# Patient Record
Sex: FEMALE | Race: WHITE | NOT HISPANIC OR LATINO | Employment: OTHER | URBAN - METROPOLITAN AREA
[De-identification: names, ages, dates, MRNs, and addresses within clinical notes are randomized per-mention and may not be internally consistent; named-entity substitution may affect disease eponyms.]

---

## 2017-04-19 ENCOUNTER — LAB REQUISITION (OUTPATIENT)
Dept: LAB | Facility: HOSPITAL | Age: 77
End: 2017-04-19
Payer: MEDICARE

## 2017-04-19 DIAGNOSIS — J32.0 CHRONIC MAXILLARY SINUSITIS: ICD-10-CM

## 2017-04-19 PROCEDURE — 87205 SMEAR GRAM STAIN: CPT | Performed by: PHYSICIAN ASSISTANT

## 2017-04-19 PROCEDURE — 87070 CULTURE OTHR SPECIMN AEROBIC: CPT | Performed by: PHYSICIAN ASSISTANT

## 2017-04-21 LAB
BACTERIA WND AEROBE CULT: NORMAL
GRAM STN SPEC: NORMAL
GRAM STN SPEC: NORMAL

## 2018-05-22 RX ORDER — LEVOFLOXACIN 500 MG/1
750 TABLET, FILM COATED ORAL EVERY 24 HOURS
COMMUNITY

## 2018-05-22 RX ORDER — LANOLIN ALCOHOL/MO/W.PET/CERES
1 CREAM (GRAM) TOPICAL 3 TIMES DAILY
COMMUNITY

## 2018-05-22 RX ORDER — MELOXICAM 15 MG/1
15 TABLET ORAL DAILY
COMMUNITY

## 2018-05-22 RX ORDER — B-COMPLEX WITH VITAMIN C
1 TABLET ORAL 2 TIMES DAILY WITH MEALS
COMMUNITY

## 2018-05-22 RX ORDER — ATENOLOL 100 MG/1
50 TABLET ORAL
COMMUNITY

## 2018-05-22 RX ORDER — MAGNESIUM 30 MG
30 TABLET ORAL 3 TIMES DAILY
COMMUNITY

## 2018-05-22 RX ORDER — MULTIVITAMIN
1 TABLET ORAL DAILY
COMMUNITY

## 2018-05-22 RX ORDER — PAROXETINE HYDROCHLORIDE 20 MG/1
10 TABLET, FILM COATED ORAL DAILY
COMMUNITY

## 2018-05-22 RX ORDER — ALPRAZOLAM 0.25 MG/1
TABLET ORAL 2 TIMES DAILY
COMMUNITY

## 2018-05-22 RX ORDER — OMEPRAZOLE 20 MG/1
20 CAPSULE, DELAYED RELEASE ORAL DAILY
COMMUNITY

## 2018-05-22 RX ORDER — MULTIVIT-MIN/IRON/FOLIC ACID/K 18-600-40
1000 CAPSULE ORAL EVERY EVENING
COMMUNITY

## 2018-05-22 NOTE — PRE-PROCEDURE INSTRUCTIONS
Pre-Surgery Instructions:   Medication Instructions    ALPRAZolam (XANAX) 0 25 mg tablet Instructed patient per Anesthesia Guidelines   atenolol (TENORMIN) 100 mg tablet Instructed patient per Anesthesia Guidelines   calcium carbonate-vitamin D (OSCAL-D) 500 mg-200 units per tablet Instructed patient per Anesthesia Guidelines   Cholecalciferol (VITAMIN D) 2000 units CAPS Instructed patient per Anesthesia Guidelines   glucosamine-chondroitin 500-400 MG tablet Instructed patient per Anesthesia Guidelines   levofloxacin (LEVAQUIN) 500 mg tablet Instructed patient per Anesthesia Guidelines   magnesium 30 MG tablet Instructed patient per Anesthesia Guidelines   meloxicam (MOBIC) 15 mg tablet Instructed patient per Anesthesia Guidelines   Multiple Vitamin (MULTIVITAMIN) tablet Instructed patient per Anesthesia Guidelines   omeprazole (PriLOSEC) 20 mg delayed release capsule Instructed patient per Anesthesia Guidelines   PARoxetine (PAXIL) 20 mg tablet Instructed patient per Anesthesia Guidelines   Polyethylene Glycol 400 (BLINK TEARS OP) Instructed patient per Anesthesia Guidelines  To take xanax, paxil, omeprazole and eye drops a m   Of surgery

## 2018-05-23 NOTE — H&P
H&P Exam - Urology       Patient: Jayleen Kauffman   : 1940 Sex: female   MRN: [id number]     CSN: [id number]      History of Present Illness   HPI:  Jayleen Kauffman is a 69 y o  female who presents with right hydronephrosis for evaluation        Review of Systems:   Constitutional:  Negative for activity change, fever, chills and diaphoresis  HENT: Negative for hearing loss and trouble swallowing  Eyes: Negative for itching and visual disturbance  Respiratory: Negative for chest tightness and shortness of breath  Cardiovascular: Negative for chest pain, edema  Gastrointestinal: Negative for abdominal distention, na abdominal pain, constipation, diarrhea, Nausea and vomiting  Genitourinary: Negative for decreased urine volume, difficulty urinating, dysuria, enuresis, frequency, hematuria and urgency  Musculoskeletal: Negative for gait problem and myalgias  Neurological: Negative for dizziness and headaches  Hematological: Does not bruise/bleed easily         Historical Information   Past Medical History:   Diagnosis Date    Anxiety     Arthritis     Breast CA ([facility] )     had right breast ca - had a lumpectomy followed with radiation RX; re-occurred  2017 with mets to spine    Cancer ([facility] )     Chronic kidney disease     Fibromyalgia, primary     GERD (gastroesophageal reflux disease)     H/O seasonal allergies     Hiatal hernia     Hydronephrosis     right side- possibly a tumor on the kidney    Hypertension     Knee joint pain     right knee from djd    Pneumonia     on antibiotics for pneumonia x 10 days- finishing    RX today     Past Surgical History:   Procedure Laterality Date    ABCESS DRAINAGE      left hand    BREAST BIOPSY      left x2 in the 's then right breast 2005    BREAST SURGERY      CARPAL TUNNEL RELEASE      CATARACT EXTRACTION      CHOLECYSTECTOMY      COLONOSCOPY      CYST REMOVAL      right leg    FOOT SURGERY      HIP ARTHROPLASTY Right       JOINT REPLACEMENT      LYMPH NODE BIOPSY      left  axilla    REMOVAL VENOUS PORT (PORT-A-CATH)  2017    SEPTOPLASTY  2013    TONSILLECTOMY       Social History   History   Alcohol Use No     History   Drug Use No     History   Smoking Status    Never Smoker   Smokeless Tobacco    Never Used     Family History: History reviewed  No pertinent family history  Meds/Allergies   No prescriptions prior to admission  No Known Allergies    Objective   Vitals: Ht 5' 3" (1 6 m)   Wt 73 9 kg (163 lb)   BMI 28 87 kg/m²     Physical Exam:  General Alert awake   Normocephalic atraumatic PERRLA  Lungs clear bilaterally  Cardiac normal S1 normal S2  Abdomen soft, flank pain  Extremities no edema    No intake/output data recorded      Invasive Devices          No matching active lines, drains, or airways              Lab Results: CBC: No results found for: WBC, HGB, HCT, MCV, PLT, ADJUSTEDWBC, MCH, MCHC, RDW, MPV, NRBC  CMP: No results found for: NA, CL, CO2, ANIONGAP, BUN, CREATININE, GLUCOSE, CALCIUM, AST, ALT, ALKPHOS, PROT, ALBUMIN, BILITOT, EGFR  Urinalysis: No results found for: Deanna Pod, SPECGRAV, PHUR, LEUKOCYTESUR, NITRITE, PROTEINUA, GLUCOSEU, KETONESU, BILIRUBINUR, BLOODU  Urine Culture: No results found for: URINECX  PSA: No results found for: PSA        Assessment/ Plan:  Cysto right retrograde right ureteroscopy possible biopsy stent placement      Yohana Soto MD

## 2018-05-24 ENCOUNTER — ANESTHESIA EVENT (OUTPATIENT)
Dept: PERIOP | Facility: HOSPITAL | Age: 78
End: 2018-05-24
Payer: MEDICARE

## 2018-05-24 ENCOUNTER — ANESTHESIA (OUTPATIENT)
Dept: PERIOP | Facility: HOSPITAL | Age: 78
End: 2018-05-24
Payer: MEDICARE

## 2018-05-24 ENCOUNTER — HOSPITAL ENCOUNTER (OUTPATIENT)
Dept: RADIOLOGY | Facility: HOSPITAL | Age: 78
Setting detail: OUTPATIENT SURGERY
Discharge: HOME | End: 2018-05-24
Payer: MEDICARE

## 2018-05-24 ENCOUNTER — HOSPITAL ENCOUNTER (OUTPATIENT)
Facility: HOSPITAL | Age: 78
Setting detail: OUTPATIENT SURGERY
Discharge: HOME | End: 2018-05-24
Attending: SPECIALIST | Admitting: SPECIALIST
Payer: MEDICARE

## 2018-05-24 VITALS
HEART RATE: 88 BPM | DIASTOLIC BLOOD PRESSURE: 78 MMHG | SYSTOLIC BLOOD PRESSURE: 129 MMHG | OXYGEN SATURATION: 96 % | WEIGHT: 163 LBS | TEMPERATURE: 97.4 F | BODY MASS INDEX: 28.88 KG/M2 | HEIGHT: 63 IN | RESPIRATION RATE: 18 BRPM

## 2018-05-24 LAB
ATRIAL RATE: 99 BPM
P AXIS: 20 DEGREES
PR INTERVAL: 140 MS
QRS AXIS: -48 DEGREES
QRSD INTERVAL: 92 MS
QT INTERVAL: 358 MS
QTC INTERVAL: 459 MS
T WAVE AXIS: -25 DEGREES
VENTRICULAR RATE: 99 BPM

## 2018-05-24 PROCEDURE — C2617 STENT, NON-COR, TEM W/O DEL: HCPCS | Performed by: SPECIALIST

## 2018-05-24 PROCEDURE — 93005 ELECTROCARDIOGRAM TRACING: CPT

## 2018-05-24 PROCEDURE — 93010 ELECTROCARDIOGRAM REPORT: CPT | Performed by: INTERNAL MEDICINE

## 2018-05-24 PROCEDURE — 74450 X-RAY URETHRA/BLADDER: CPT

## 2018-05-24 DEVICE — URETERAL STENT 6 FR X 24CM INLAY: Type: IMPLANTABLE DEVICE | Site: URETER | Status: FUNCTIONAL

## 2018-05-24 RX ORDER — ONDANSETRON 2 MG/ML
4 INJECTION INTRAMUSCULAR; INTRAVENOUS ONCE AS NEEDED
Status: DISCONTINUED | OUTPATIENT
Start: 2018-05-24 | End: 2018-05-24 | Stop reason: HOSPADM

## 2018-05-24 RX ORDER — PROPOFOL 10 MG/ML
INJECTION, EMULSION INTRAVENOUS AS NEEDED
Status: DISCONTINUED | OUTPATIENT
Start: 2018-05-24 | End: 2018-05-24 | Stop reason: SURG

## 2018-05-24 RX ORDER — FENTANYL CITRATE 50 UG/ML
INJECTION, SOLUTION INTRAMUSCULAR; INTRAVENOUS AS NEEDED
Status: DISCONTINUED | OUTPATIENT
Start: 2018-05-24 | End: 2018-05-24 | Stop reason: SURG

## 2018-05-24 RX ORDER — MIDAZOLAM HYDROCHLORIDE 1 MG/ML
INJECTION INTRAMUSCULAR; INTRAVENOUS AS NEEDED
Status: DISCONTINUED | OUTPATIENT
Start: 2018-05-24 | End: 2018-05-24 | Stop reason: SURG

## 2018-05-24 RX ORDER — PROPOFOL 10 MG/ML
INJECTION, EMULSION INTRAVENOUS CONTINUOUS PRN
Status: DISCONTINUED | OUTPATIENT
Start: 2018-05-24 | End: 2018-05-24 | Stop reason: SURG

## 2018-05-24 RX ORDER — MAGNESIUM HYDROXIDE 1200 MG/15ML
LIQUID ORAL AS NEEDED
Status: DISCONTINUED | OUTPATIENT
Start: 2018-05-24 | End: 2018-05-24 | Stop reason: HOSPADM

## 2018-05-24 RX ORDER — SODIUM CHLORIDE 9 MG/ML
125 INJECTION, SOLUTION INTRAVENOUS CONTINUOUS
Status: DISCONTINUED | OUTPATIENT
Start: 2018-05-24 | End: 2018-05-24 | Stop reason: HOSPADM

## 2018-05-24 RX ORDER — FENTANYL CITRATE/PF 50 MCG/ML
25 SYRINGE (ML) INJECTION
Status: DISCONTINUED | OUTPATIENT
Start: 2018-05-24 | End: 2018-05-24 | Stop reason: HOSPADM

## 2018-05-24 RX ORDER — LEVOFLOXACIN 5 MG/ML
500 INJECTION, SOLUTION INTRAVENOUS ONCE
Status: COMPLETED | OUTPATIENT
Start: 2018-05-24 | End: 2018-05-24

## 2018-05-24 RX ADMIN — FENTANYL CITRATE 50 MCG: 50 INJECTION, SOLUTION INTRAMUSCULAR; INTRAVENOUS at 09:37

## 2018-05-24 RX ADMIN — FENTANYL CITRATE 50 MCG: 50 INJECTION, SOLUTION INTRAMUSCULAR; INTRAVENOUS at 09:40

## 2018-05-24 RX ADMIN — MIDAZOLAM HYDROCHLORIDE 1 MG: 1 INJECTION, SOLUTION INTRAMUSCULAR; INTRAVENOUS at 09:34

## 2018-05-24 RX ADMIN — MIDAZOLAM HYDROCHLORIDE 1 MG: 1 INJECTION, SOLUTION INTRAMUSCULAR; INTRAVENOUS at 09:37

## 2018-05-24 RX ADMIN — LEVOFLOXACIN 500 MG: 5 INJECTION, SOLUTION INTRAVENOUS at 09:29

## 2018-05-24 RX ADMIN — PROPOFOL 50 MG: 10 INJECTION, EMULSION INTRAVENOUS at 09:37

## 2018-05-24 RX ADMIN — PROPOFOL 80 MCG/KG/MIN: 10 INJECTION, EMULSION INTRAVENOUS at 09:37

## 2018-05-24 NOTE — ANESTHESIA PREPROCEDURE EVALUATION
Review of Systems/Medical History  Patient summary reviewed  Chart reviewed  No history of anesthetic complications     Cardiovascular  Hypertension ,    Pulmonary       GI/Hepatic    GERD ,  Hiatal hernia,        Kidney disease CKD,        Endo/Other     GYN    Breast cancer (had right breast ca 2005- had a lumpectomy followed with radiation RX; re-occurred  2017 with mets to spine) left lumpectomy       Hematology   Musculoskeletal    Comment: Fibromyalgia, primary Arthritis     Neurology   Psychology   Anxiety,              Physical Exam    Airway    Mallampati score: II  TM Distance: >3 FB  Neck ROM: full     Dental   No notable dental hx     Cardiovascular  Cardiovascular exam normal    Pulmonary  Pulmonary exam normal     Other Findings        Anesthesia Plan  ASA Score- 3     Anesthesia Type- general with ASA Monitors  Additional Monitors:   Airway Plan: LMA  Plan Factors-    Induction- intravenous  Postoperative Plan- Plan for postoperative opioid use  Informed Consent- Anesthetic plan and risks discussed with patient

## 2018-05-24 NOTE — DISCHARGE INSTRUCTIONS
#1 no heavy straining or lifting above 10 pounds for 2 weeks    #2 call office fevers, chills, or worsening blood in the urine  #3 call office for follow-up in  4 weeks  Arely PADILLA  [address] [place] [address] office  [address] [place] [address]  [phone]  8:30 AM to 4:30 PM  Monday through Friday    [place] office  [phone]  [address]  [phone]  1:00 to 5:00 PM  Wednesday

## 2018-05-24 NOTE — OP NOTE
OPERATIVE REPORT  PATIENT NAME: Kristy Coon    :  1940  MRN: [id number]  Pt Location: [facility] OR ROOM 04    SURGERY DATE: 2018    Surgeon(s) and Role:     * Annia Thorpe MD - Primary    Preop Diagnosis:  Right severe hydronephrosis   right duodenal /retroperitoneal mass   history of breast cancer    Post-Op Diagnosis Codes:    Right severe hydronephrosis   right duodenal/ retroperitoneal mass   history of breast cancer   ureteral obstruction right proximal      Specimen(s):  * No specimens in log *    Estimated Blood Loss:   Minimal    Drains:  Ureteral Drain/Stent Right ureter 6 Fr  (Active)   Number of days: 0       Anesthesia Type:   IV Sedation with Anesthesia    Operative Indications:  Hydronephrosis [N13 30]   this 50-year-old female with history of bilateral breast cancer followed by Dr Colette Mccoy was found to have a severe right hydronephrosis on ultrasound and a retroperitoneal adenopathy impinging on the right ureter as well as duodenum patient has severe nausea and vomiting being worked up by Dr Jyothi Merino with endoscopy possible biopsy next week patient now to undergo cysto retrograde stent placement for impending chemo    Operative Findings:   1  Right distal ureteral stenosis noted on ureteroscopy with kinking of the right proximal ureter causing tortuosity of the proximal ureter above it requiring ureteroscopy to place wire and 24 cm 6 Yemeni stent successfully   2   Patient will need stent changes at 3 month intervals until obstruction has cleared if possible    Complications:   None    Procedure and Technique:   after the patient identified in the holding area and consent signed right side marked she was board the op site after anesthesia induced was placed in the thigh position draped prep standard fashion time-out performed 22 Yemeni cystoscope passed 5 [place] Sophie open-ended catheter placed to the  Right orifice retrograde pyelogram confirmed mild stenosis of the distal ureter with a slight hydroureter with stenosis again in the proximal ureter with  Severe hydronephrosis above the area a 0 038 wire was passed up the open-ended catheter but was and able to be passed through this tortuous and stenotic area there were 2 clips noted at the right UPJ which apparently were possibly due to gallbladder surgery but no obstruction was noted from these 2 clips with the stenosis noted 1 cm below the clips multiple attempts at passing a wire up by the stenotic area 1 successful the 1st wire was left in at that proximal site 2nd wire was passed a semi rigid ureteral scope was attempted to be passed up distal ureter with stenosis noted with extreme difficulty the semi rigid scope was removed and over the 2nd wire the flexible scope was passed upward by the stenotic distal ureter towards the vessels at this point the 2 038nd wire was then bluntly pushed upward finally passing the stenotic area and up into the right renal pelvis the flexible scope was removed cystoscope replaced and the 5 Western Sophie open-ended [place] passed up the wire into the proximal ureter past the stenosis wire was then slowly advanced up into the severely hydronephrotic renal pelvis once the wire was within the pelvis correctly the 5 Western Sophie for stent was removed the safety wire was removed and a 24 cm 6 [place]ian stent was passed wire removed scope removed postop procedure well was awakened taken recovery room stable condition   I was present for the entire procedure    Patient Disposition:  PACU     SIGNATURE: Cecille Hansen MD  DATE: May 24, 2018  TIME: 10:29 AM

## 2018-05-24 NOTE — PERIOPERATIVE NURSING NOTE
Received from pacu  Denies pain  Ambulated to bathroom and voided moderate amount of punch colored urine with out clots  taking po fluids

## (undated) DEVICE — CYSTO TUBING TUR Y IRRIGATION

## (undated) DEVICE — SEAL BIOPSY PORT ADJUST F/ACCESSORIES UP TO 3FR

## (undated) DEVICE — POUCH UR CATCHER STERILE

## (undated) DEVICE — CYSTOSCOPY PACK: Brand: CONVERTORS

## (undated) DEVICE — LUBRICANT SURGILUBE TUBE 4 OZ  FLIP TOP

## (undated) DEVICE — FABRIC REINFORCED, SURGICAL GOWN, XL: Brand: CONVERTORS

## (undated) DEVICE — Device

## (undated) DEVICE — GLOVE SRG BIOGEL 8

## (undated) DEVICE — SYRINGE 10ML LL CONTROL TOP

## (undated) DEVICE — SEAL ADJUST BIOPSY PORT Y ADAPTOR

## (undated) DEVICE — URETERAL CATHETER POLLACK OPEN ENDED 5FR

## (undated) DEVICE — POV-IOD SOLUTION 4OZ BT

## (undated) DEVICE — RADIOLOGY STERILE LABELS: Brand: CENTURION